# Patient Record
Sex: MALE | Race: BLACK OR AFRICAN AMERICAN | NOT HISPANIC OR LATINO | Employment: FULL TIME | URBAN - METROPOLITAN AREA
[De-identification: names, ages, dates, MRNs, and addresses within clinical notes are randomized per-mention and may not be internally consistent; named-entity substitution may affect disease eponyms.]

---
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
## 2022-01-24 ENCOUNTER — OFFICE VISIT (OUTPATIENT)
Dept: FAMILY MEDICINE | Facility: CLINIC | Age: 22
End: 2022-01-24
Payer: COMMERCIAL

## 2022-01-24 VITALS
BODY MASS INDEX: 18.22 KG/M2 | WEIGHT: 127.3 LBS | HEIGHT: 70 IN | DIASTOLIC BLOOD PRESSURE: 81 MMHG | TEMPERATURE: 98.9 F | HEART RATE: 63 BPM | SYSTOLIC BLOOD PRESSURE: 119 MMHG | OXYGEN SATURATION: 100 %

## 2022-01-24 DIAGNOSIS — Z00.00 HEALTHCARE MAINTENANCE: ICD-10-CM

## 2022-01-24 DIAGNOSIS — F19.10 SUBSTANCE ABUSE (H): Primary | ICD-10-CM

## 2022-01-24 DIAGNOSIS — G47.00 INSOMNIA, UNSPECIFIED TYPE: ICD-10-CM

## 2022-01-24 DIAGNOSIS — F11.93 OPIOID WITHDRAWAL (H): ICD-10-CM

## 2022-01-24 LAB
ALBUMIN SERPL-MCNC: 4.2 G/DL (ref 3.4–5)
ALP SERPL-CCNC: 93 U/L (ref 40–150)
ALT SERPL W P-5'-P-CCNC: 98 U/L (ref 0–70)
ANION GAP SERPL CALCULATED.3IONS-SCNC: 7 MMOL/L (ref 3–14)
AST SERPL W P-5'-P-CCNC: 45 U/L (ref 0–45)
BASOPHILS # BLD AUTO: 0 10E3/UL (ref 0–0.2)
BASOPHILS NFR BLD AUTO: 1 %
BILIRUB SERPL-MCNC: 0.5 MG/DL (ref 0.2–1.3)
BUN SERPL-MCNC: 14 MG/DL (ref 7–30)
CALCIUM SERPL-MCNC: 9.4 MG/DL (ref 8.5–10.1)
CHLORIDE BLD-SCNC: 104 MMOL/L (ref 94–109)
CO2 SERPL-SCNC: 28 MMOL/L (ref 20–32)
CREAT SERPL-MCNC: 0.76 MG/DL (ref 0.66–1.25)
EOSINOPHIL # BLD AUTO: 0.1 10E3/UL (ref 0–0.7)
EOSINOPHIL NFR BLD AUTO: 2 %
ERYTHROCYTE [DISTWIDTH] IN BLOOD BY AUTOMATED COUNT: 12.3 % (ref 10–15)
GFR SERPL CREATININE-BSD FRML MDRD: >90 ML/MIN/1.73M2
GLUCOSE BLD-MCNC: 88 MG/DL (ref 70–99)
HCT VFR BLD AUTO: 38.5 % (ref 40–53)
HGB BLD-MCNC: 13.1 G/DL (ref 13.3–17.7)
IMM GRANULOCYTES # BLD: 0 10E3/UL
IMM GRANULOCYTES NFR BLD: 0 %
LYMPHOCYTES # BLD AUTO: 2.6 10E3/UL (ref 0.8–5.3)
LYMPHOCYTES NFR BLD AUTO: 44 %
MCH RBC QN AUTO: 29.2 PG (ref 26.5–33)
MCHC RBC AUTO-ENTMCNC: 34 G/DL (ref 31.5–36.5)
MCV RBC AUTO: 86 FL (ref 78–100)
MONOCYTES # BLD AUTO: 0.5 10E3/UL (ref 0–1.3)
MONOCYTES NFR BLD AUTO: 9 %
NEUTROPHILS # BLD AUTO: 2.6 10E3/UL (ref 1.6–8.3)
NEUTROPHILS NFR BLD AUTO: 44 %
NRBC # BLD AUTO: 0 10E3/UL
NRBC BLD AUTO-RTO: 0 /100
PLATELET # BLD AUTO: 235 10E3/UL (ref 150–450)
POTASSIUM BLD-SCNC: 4.6 MMOL/L (ref 3.4–5.3)
PROT SERPL-MCNC: 8 G/DL (ref 6.8–8.8)
RBC # BLD AUTO: 4.48 10E6/UL (ref 4.4–5.9)
SODIUM SERPL-SCNC: 139 MMOL/L (ref 133–144)
WBC # BLD AUTO: 5.9 10E3/UL (ref 4–11)

## 2022-01-24 PROCEDURE — 86706 HEP B SURFACE ANTIBODY: CPT | Performed by: NURSE PRACTITIONER

## 2022-01-24 PROCEDURE — 86704 HEP B CORE ANTIBODY TOTAL: CPT | Performed by: NURSE PRACTITIONER

## 2022-01-24 PROCEDURE — 80053 COMPREHEN METABOLIC PANEL: CPT | Performed by: NURSE PRACTITIONER

## 2022-01-24 PROCEDURE — 86803 HEPATITIS C AB TEST: CPT | Performed by: NURSE PRACTITIONER

## 2022-01-24 PROCEDURE — 87340 HEPATITIS B SURFACE AG IA: CPT | Performed by: NURSE PRACTITIONER

## 2022-01-24 PROCEDURE — 86481 TB AG RESPONSE T-CELL SUSP: CPT | Performed by: NURSE PRACTITIONER

## 2022-01-24 PROCEDURE — 85025 COMPLETE CBC W/AUTO DIFF WBC: CPT | Performed by: NURSE PRACTITIONER

## 2022-01-24 RX ORDER — NICOTINE 21 MG/24HR
1 PATCH, TRANSDERMAL 24 HOURS TRANSDERMAL EVERY 24 HOURS
COMMUNITY

## 2022-01-24 RX ORDER — TRAZODONE HYDROCHLORIDE 100 MG/1
100 TABLET ORAL
COMMUNITY
End: 2022-01-24

## 2022-01-24 RX ORDER — ACETAMINOPHEN 500 MG
500-1000 TABLET ORAL EVERY 4 HOURS PRN
COMMUNITY

## 2022-01-24 RX ORDER — AMOXICILLIN 250 MG
1 CAPSULE ORAL 3 TIMES DAILY PRN
COMMUNITY

## 2022-01-24 RX ORDER — IBUPROFEN 200 MG
200-600 TABLET ORAL EVERY 4 HOURS PRN
COMMUNITY

## 2022-01-24 RX ORDER — DOCUSATE SODIUM 100 MG/1
100 CAPSULE, LIQUID FILLED ORAL 2 TIMES DAILY PRN
COMMUNITY
End: 2022-01-24

## 2022-01-24 RX ORDER — HYDROXYZINE HYDROCHLORIDE 25 MG/1
25-50 TABLET, FILM COATED ORAL EVERY 6 HOURS PRN
Qty: 60 TABLET | Refills: 1 | Status: SHIPPED | OUTPATIENT
Start: 2022-01-24 | End: 2022-03-14

## 2022-01-24 RX ORDER — TRAZODONE HYDROCHLORIDE 50 MG/1
75 TABLET, FILM COATED ORAL
Qty: 30 TABLET | Refills: 1 | Status: SHIPPED | OUTPATIENT
Start: 2022-01-24

## 2022-01-24 RX ORDER — BUPRENORPHINE HYDROCHLORIDE AND NALOXONE HYDROCHLORIDE DIHYDRATE 8; 2 MG/1; MG/1
1 TABLET SUBLINGUAL 2 TIMES DAILY
COMMUNITY
End: 2022-01-24

## 2022-01-24 ASSESSMENT — ENCOUNTER SYMPTOMS
NAUSEA: 0
BRUISES/BLEEDS EASILY: 0
EYES NEGATIVE: 1
TREMORS: 0
DIARRHEA: 0
FEVER: 0
FATIGUE: 0
COUGH: 0
JOINT SWELLING: 0
SORE THROAT: 0
SHORTNESS OF BREATH: 0
AGITATION: 0
WHEEZING: 0
ARTHRALGIAS: 0
ABDOMINAL PAIN: 0
CONSTIPATION: 1
ALLERGIC/IMMUNOLOGIC NEGATIVE: 1
ADENOPATHY: 0
VOMITING: 0
DIZZINESS: 0
UNEXPECTED WEIGHT CHANGE: 0
WEAKNESS: 0
PALPITATIONS: 0
NERVOUS/ANXIOUS: 0

## 2022-01-24 ASSESSMENT — PATIENT HEALTH QUESTIONNAIRE - PHQ9
5. POOR APPETITE OR OVEREATING: NOT AT ALL
SUM OF ALL RESPONSES TO PHQ QUESTIONS 1-9: 1

## 2022-01-24 ASSESSMENT — ANXIETY QUESTIONNAIRES
1. FEELING NERVOUS, ANXIOUS, OR ON EDGE: NOT AT ALL
2. NOT BEING ABLE TO STOP OR CONTROL WORRYING: NOT AT ALL
IF YOU CHECKED OFF ANY PROBLEMS ON THIS QUESTIONNAIRE, HOW DIFFICULT HAVE THESE PROBLEMS MADE IT FOR YOU TO DO YOUR WORK, TAKE CARE OF THINGS AT HOME, OR GET ALONG WITH OTHER PEOPLE: NOT DIFFICULT AT ALL
7. FEELING AFRAID AS IF SOMETHING AWFUL MIGHT HAPPEN: NOT AT ALL
3. WORRYING TOO MUCH ABOUT DIFFERENT THINGS: NOT AT ALL
GAD7 TOTAL SCORE: 0
6. BECOMING EASILY ANNOYED OR IRRITABLE: NOT AT ALL
5. BEING SO RESTLESS THAT IT IS HARD TO SIT STILL: NOT AT ALL

## 2022-01-24 ASSESSMENT — MIFFLIN-ST. JEOR: SCORE: 1596.86

## 2022-01-24 NOTE — PATIENT INSTRUCTIONS
-Use hydroxyzine for withdrawal symptoms.    -Start new dose of trazodone tonight     Patient Education     Opioid Withdrawal  Opioid withdrawal occurs if you've used opioids daily for at least 3 weeks. Symptoms often start about 12 hours after the last dose of the opioid. But this varies greatly. It depends on which opioid you were using and how you were taking it (injecting, snorting, or pills). Withdrawal symptoms last from 3 to 5 days. They may include yawning, sweating, runny nose, restlessness, stomach cramping, diarrhea, nausea, vomiting, hot and cold flashes, and trouble sleeping.   Home care  Follow these guidelines when caring for yourself at home:     Stay with someone who can help you and give you emotional support during this time. Don't take more of the addicting drug to stop your symptoms.    If you have stomach cramps, nausea, or vomiting, take only clear liquids until the symptoms get better. Adults should drink a total of 2 to 3 quarts of liquid daily. It's best to take small frequent drinks rather than a few large ones. You may have liquids in any of these forms: mineral water, apple juice, sports drinks, soft drinks without caffeine, clear broth soups, plain gelatin, and ice pops.    If you've been prescribed medicines to help manage your withdrawal symptoms, take them exactly as prescribed. Don't change or stop your medicine without calling your provider.    Don't use alcohol, caffeine, or tobacco during this time.    If you were given a clonidine patch, leave this on for 7 days. Call your provider if you have excess dizziness or drowsiness.  Follow-up care  Follow up with your healthcare provider as advised. After you've gone through withdrawal, enroll in an outpatient treatment program. Getting through withdrawal is the first step in a long journey to living a drug-free life. Having trained professionals support you will make it more likely that you'll succeed.   When to get medical  advice  Call your healthcare provider right away if you have any of these:     Fever of 100.4 F (38 C) or higher, or as directed by your provider    Shaking chills    Inability to keep down liquids for 8 hours    Frequent diarrhea    Signs of infection at the site of IV needle use (redness, warmth, pain, or swelling)  Call 911  Call 911 if any of these occur:     Trouble breathing, swallowing, or wheezing    Severe confusion    Extreme drowsiness or trouble awakening    Fainting (loss of consciousness)    Rapid heart rate or very slow heart rate    Very low or very high blood pressure    Vomiting blood, or large amounts of blood in stool    Seizure  [employer] last reviewed this educational content on 2/1/2020 2000-2021 [employer]. All rights reserved. This information is not intended as a substitute for professional medical care. Always follow your healthcare professional's instructions.

## 2022-01-24 NOTE — NURSING NOTE
"ROOM:2    Preferred Name: Peter     22 year old  Chief Complaint   Patient presents with     Physical       Blood pressure 119/81, pulse 63, temperature 98.9  F (37.2  C), temperature source Oral, height 1.783 m (5' 10.2\"), weight 57.7 kg (127 lb 4.8 oz), SpO2 100 %. Body mass index is 18.16 kg/m .  BP completed using cuff size:    There is no problem list on file for this patient.      Wt Readings from Last 2 Encounters:   01/24/22 57.7 kg (127 lb 4.8 oz)   11/06/21 54.4 kg (120 lb) (4 %, Z= -1.81)*     * Growth percentiles are based on CDC (Boys, 2-20 Years) data.     BP Readings from Last 3 Encounters:   01/24/22 119/81   11/06/21 (!) 142/97       No Known Allergies    Current Outpatient Medications   Medication     acetaminophen (TYLENOL) 500 MG tablet     aspirin effervescent (BHAKTI SELZER ORIGINAL) 325 MG TBEF tablet     buprenorphine-naloxone (SUBOXONE) 8-2 MG SUBL sublingual tablet     docusate sodium (COLACE) 100 MG capsule     ibuprofen (ADVIL/MOTRIN) 200 MG tablet     naloxone (NARCAN) 4 MG/0.1ML nasal spray     nicotine (NICODERM CQ) 14 MG/24HR 24 hr patch     nicotine (NICORETTE) 2 MG gum     senna-docusate (SENOKOT-S/PERICOLACE) 8.6-50 MG tablet     traZODone (DESYREL) 100 MG tablet     No current facility-administered medications for this visit.       Social History     Tobacco Use     Smoking status: Current Some Day Smoker     Smokeless tobacco: Never Used   Vaping Use     Vaping Use: Every day   Substance Use Topics     Alcohol use: Not Currently     Drug use: Not Currently       Honoring Choices - Health Care Directive Guide offered to patient at time of visit.    Health Maintenance Due   Topic Date Due     PREVENTIVE CARE VISIT  Never done     ADVANCE CARE PLANNING  Never done     COVID-19 Vaccine (1) Never done     HIV SCREENING  Never done     HEPATITIS C SCREENING  Never done     INFLUENZA VACCINE (1) 09/01/2021       Immunization History   Administered Date(s) Administered     HPV " 06/16/2016, 10/07/2016, 02/27/2017     Hep B, Peds or Adolescent 12/10/2015, 06/16/2016, 09/26/2017     HepA-ped 2 Dose 12/10/2015, 06/16/2016     Influenza Intranasal Vaccine 4 valent (FluMist) 12/10/2015     Influenza Vaccine IM > 6 months Valent IIV4 (Alfuria,Fluzone) 09/26/2017     Influenza,INJ,MDCK,PF,Quad >4yrs 10/22/2016     MMR 10/13/2014, 12/10/2015     Meningococcal (Menveo ) 10/13/2014, 09/03/2019     Poliovirus, inactivated (IPV) 12/10/2015, 06/16/2016     TD (ADULT, 7+) 12/10/2015, 06/16/2016     Tdap (Adacel,Boostrix) 10/13/2014, 03/03/2020     Varicella 10/13/2014, 12/10/2015       No results found for: PAP    No lab results found.    PHQ-2 ( 1999 Pfizer) 1/24/2022   Q1: Little interest or pleasure in doing things 0   Q2: Feeling down, depressed or hopeless 0   PHQ-2 Score 0       PHQ-9 SCORE 1/24/2022   PHQ-9 Total Score 1       OMERO-7 SCORE 1/24/2022   Total Score 0       No flowsheet data found.    Jessica Brunson    January 24, 2022 12:15 PM

## 2022-01-24 NOTE — PROGRESS NOTES
Patient: Peter Jack YOB: 2000  Date of Exam: January/24/2022   Arrival Time: 12 03 PM  Departure Time: 01 10 PM    Allergies: No Known Allergies    Patient Concerns: No chief complaint on file.      Immunizations:   Most Recent Immunizations   Administered Date(s) Administered     HPV 02/27/2017     Hep B, Peds or Adolescent 09/26/2017     HepA-ped 2 Dose 06/16/2016     Influenza Intranasal Vaccine 4 valent (FluMist) 12/10/2015     Influenza Vaccine IM > 6 months Valent IIV4 (Alfuria,Fluzone) 09/26/2017     Influenza,INJ,MDCK,PF,Quad >4yrs 10/22/2016     MMR 12/10/2015     Meningococcal (Menveo ) 09/03/2019     Poliovirus, inactivated (IPV) 06/16/2016     TD (ADULT, 7+) 06/16/2016     Tdap (Adacel,Boostrix) 03/03/2020     Varicella 12/10/2015       Do you need any refills on your Medications today? No    Free of communicable diseases? Yes    Health Maintenance:   NO HEALTH MAINTENANCE DUE AT THIS TIME   Overdue for flu and covid vaccine- declines both.    HPI: 22 year old male presents for [facility] physical. Patient's drug of choice was oral fentanyl. Has been sober for 29 days since admittance for [facility]. Patient was on suboxone, however, he reports that he stopped taking suboxone since last night as he doesn't like the constipation that comes along with it. Patient denies previous IV drug use. Denies hx of sexual activity and unprotected sexual intercourse. Reports recent negative HIV test. Has used trazodone for insomnia however, he stopped the 100mg dose because he felt it was too strong while the 50mg dose wasn't effective for him.   Patient denies other acute complaints/concerns/symptoms.    Review of Systems   Constitutional: Negative for fatigue, fever and unexpected weight change.   HENT: Negative for congestion, sneezing and sore throat.    Eyes: Negative.    Respiratory: Negative for cough, shortness of breath and wheezing.    Cardiovascular: Negative for chest pain and palpitations.    Gastrointestinal: Positive for constipation. Negative for abdominal pain, diarrhea, nausea and vomiting.   Endocrine: Negative for cold intolerance and heat intolerance.   Genitourinary: Negative.    Musculoskeletal: Negative for arthralgias and joint swelling.   Skin: Negative.    Allergic/Immunologic: Negative.    Neurological: Negative for dizziness, tremors and weakness.   Hematological: Negative for adenopathy. Does not bruise/bleed easily.   Psychiatric/Behavioral: Negative for agitation and behavioral problems. The patient is not nervous/anxious.          History reviewed. No pertinent past medical history.    Past Surgical History:   Procedure Laterality Date     FINGER SURGERY  03/2020       Family History   Problem Relation Age of Onset     Diabetes Mother        Social History     Tobacco Use     Smoking status: Current Some Day Smoker     Smokeless tobacco: Never Used   Substance Use Topics     Alcohol use: Not Currently         General Physical Exam:  Vitals: Stable.   Past Medical History Reviewed? Yes.  Constitutional:   awake, alert, cooperative, no apparent distress, and appears stated age   Eyes:   Lids and lashes normal, pupils equal, round and reactive to light, extra ocular muscles intact, sclera clear, conjunctiva normal   ENT:   Normocephalic, without obvious abnormality, atraumatic, sinuses nontender on palpation, external ears without lesions, oral pharynx with moist mucous membranes, tonsils without erythema or exudates, gums normal and good dentition.   Neck:   Supple, symmetrical, trachea midline, no adenopathy, thyroid symmetric, not enlarged and no tenderness, skin normal   Hematologic / Lymphatic:   no cervical lymphadenopathy   Back:   Symmetric.   Lungs:   No increased work of breathing, good air exchange, clear to auscultation bilaterally, no crackles or wheezing   Cardiovascular:   Normal apical impulse, regular rate and rhythm, normal S1 and S2, no S3 or S4, and no murmur noted    Abdomen:   No scars, normal bowel sounds, soft, non-distended, non-tender, no masses palpated, no hepatosplenomegally     Genitounirinary:   Deferred by patient   Musculoskeletal:   There is no redness, warmth, or swelling of the joints.  Full range of motion noted.  Motor strength is 5 out of 5 all extremities bilaterally.  Tone is normal.   Neurologic:   Awake, alert, oriented to name, place and time.  Cranial nerves II-XII are grossly intact.  Motor is 5 out of 5 bilaterally.  Patellar Reflexes +2, gait is normal.   Neuropsychiatric:   General: normal, calm and normal eye contact   Skin:   no bruising or bleeding and normal skin color, texture, turgor       Recommended Diet and Special Instructions: No  Limitations or restrictions for activities: No  Information Pertinent to treatment in case of emergency: Recent discontinuation of suboxone. Advised hydroxyzine PRN for withdrawal symptoms. Trazodone at bedtime. Ibuprofen or tylenol for pain.    Assessment/Plan   1. Substance abuse (H)  - Pt reports recent HIV negative and denies IVDA/tattoos/sexual encounters.    - Hepatitis C Screen Reflex to HCV RNA Quant and Genotype  - Hepatitis B Surface Antibody  - Hepatitis B surface antigen  - Hepatitis B core antibody  - CBC with platelets differential  - Comprehensive metabolic panel  - Quantiferon TB Gold Plus    2. Opioid withdrawal (H)  - Concern for acute withdrawal symptoms given recent cessation of suboxone last night (suboxone half life 36 hours.) Spoke with NIRALI Ricks at [facility] who informed me that [facility]. Advised to start hydroxyzine as soon as withdrawal symptoms start in addition to restarting trazodone and iburprofen or tylenol prn. Keep clinic abreast on symptoms.  - hydrOXYzine (ATARAX) 25 MG tablet; Take 1-2 tablets (25-50 mg) by mouth every 6 hours as needed for itching  Dispense: 60 tablet; Refill: 1    3. Insomnia, unspecified  type  -Try trazodone 75 as 100mg was too strong but 50mg ineffective.  - traZODone (DESYREL) 50 MG tablet; Take 1.5 tablets (75 mg) by mouth nightly as needed for sleep  Dispense: 30 tablet; Refill: 1    4. Healthcare maintenance  Last dental appt 2 years ago. Pt declined COVID/flu vax.  - Dental Referral      Medication Changes:   MEDICATIONS:   Orders Placed This Encounter   Medications     DISCONTD: buprenorphine-naloxone (SUBOXONE) 8-2 MG SUBL sublingual tablet     Sig: Place 1 tablet under the tongue 2 times daily     nicotine (NICODERM CQ) 14 MG/24HR 24 hr patch     Sig: Place 1 patch onto the skin every 24 hours For 6 weeks     acetaminophen (TYLENOL) 500 MG tablet     Sig: Take 500-1,000 mg by mouth every 4 hours as needed     aspirin effervescent (BHAKTI SELZER ORIGINAL) 325 MG TBEF tablet     Sig: Take 2 tablets by mouth every 4 hours Dissolve 2 tabs in 4 oz of water before taking every 4 hours for heatburn, upset stomach     DISCONTD: docusate sodium (COLACE) 100 MG capsule     Sig: Take 100 mg by mouth 2 times daily as needed     ibuprofen (ADVIL/MOTRIN) 200 MG tablet     Sig: Take 200-600 mg by mouth every 4 hours as needed     naloxone (NARCAN) 4 MG/0.1ML nasal spray     Sig: Spray 4 mg into one nostril alternating nostrils once as needed every 2-3 minutes until assistance arrives     nicotine (NICORETTE) 2 MG gum     Sig: Place 2 mg inside cheek every 2 hours as needed     senna-docusate (SENOKOT-S/PERICOLACE) 8.6-50 MG tablet     Sig: Take 1 tablet by mouth 3 times daily as needed     DISCONTD: traZODone (DESYREL) 100 MG tablet     Sig: Take 100 mg by mouth nightly as needed     hydrOXYzine (ATARAX) 25 MG tablet     Sig: Take 1-2 tablets (25-50 mg) by mouth every 6 hours as needed for itching     Dispense:  60 tablet     Refill:  1     traZODone (DESYREL) 50 MG tablet     Sig: Take 1.5 tablets (75 mg) by mouth nightly as needed for sleep     Dispense:  30 tablet     Refill:  1          - Continue other  medications without change    Referrals   Referral to Dental - Please call [phone] to schedule your appointment    Follow up plan   Follow up if you are not improving in the next 4 weeks. or as needed.    Physical exam by Xochitl Dixon CNP, agree with above.    All questions/concerns addressed. Patient states understanding/agreement to POC.    BAILEY Lynn, CNP  [facility] [employer]

## 2022-01-24 NOTE — LETTER
January 26, 2022      Peter Jack  [address] [address]        Dear ,    We are writing to inform you of your test results.    You are immune to hepatitis B likely from previous immunization.    One of your liver enzymes (ALT) is slightly elevated. This may be from your suboxone or tylenol. Please refrain from taking tylenol or any alcohol until we repeat your labs.     Your complete blood count showed mild anemia. This may be normal for you. As long as you are not having symptoms including fatigue, pallor, shortness of breath, chest pain, nausea, vomiting or diarrhea,  then we will recheck your labs in in 4-6 weeks.    Resulted Orders   Hepatitis C Screen Reflex to HCV RNA Quant and Genotype   Result Value Ref Range    Hepatitis C Antibody Nonreactive Nonreactive    Narrative    Assay performance characteristics have not been established for newborns, infants, and children.   Hepatitis B Surface Antibody   Result Value Ref Range    Hepatitis B Surface Antibody 162.76 (H) <8.00 m[IU]/mL      Comment:      Reactive, Patient is considered to be immune to infection with hepatitis B when the value is greater than or equal to 12.0 mIU/mL.   Hepatitis B surface antigen   Result Value Ref Range    Hepatitis B Surface Antigen Nonreactive Nonreactive   Hepatitis B core antibody   Result Value Ref Range    Hepatitis B Core Antibody Total Nonreactive Nonreactive   Comprehensive metabolic panel   Result Value Ref Range    Sodium 139 133 - 144 mmol/L    Potassium 4.6 3.4 - 5.3 mmol/L    Chloride 104 94 - 109 mmol/L    Carbon Dioxide (CO2) 28 20 - 32 mmol/L    Anion Gap 7 3 - 14 mmol/L    Urea Nitrogen 14 7 - 30 mg/dL    Creatinine 0.76 0.66 - 1.25 mg/dL    Calcium 9.4 8.5 - 10.1 mg/dL    Glucose 88 70 - 99 mg/dL    Alkaline Phosphatase 93 40 - 150 U/L    AST 45 0 - 45 U/L    ALT 98 (H) 0 - 70 U/L    Protein Total 8.0 6.8 - 8.8 g/dL    Albumin 4.2 3.4 - 5.0 g/dL    Bilirubin Total 0.5 0.2 - 1.3  mg/dL    GFR Estimate >90 >60 mL/min/1.73m2      Comment:      Effective December 21, 2021 eGFRcr in adults is calculated using the 2021 CKD-EPI creatinine equation which includes age and gender (Tim et al., NE, DOI: 10.1056/HLHIvj0513887)   CBC with platelets and differential   Result Value Ref Range    WBC Count 5.9 4.0 - 11.0 10e3/uL    RBC Count 4.48 4.40 - 5.90 10e6/uL    Hemoglobin 13.1 (L) 13.3 - 17.7 g/dL    Hematocrit 38.5 (L) 40.0 - 53.0 %    MCV 86 78 - 100 fL    MCH 29.2 26.5 - 33.0 pg    MCHC 34.0 31.5 - 36.5 g/dL    RDW 12.3 10.0 - 15.0 %    Platelet Count 235 150 - 450 10e3/uL    % Neutrophils 44 %    % Lymphocytes 44 %    % Monocytes 9 %    % Eosinophils 2 %    % Basophils 1 %    % Immature Granulocytes 0 %    NRBCs per 100 WBC 0 <1 /100    Absolute Neutrophils 2.6 1.6 - 8.3 10e3/uL    Absolute Lymphocytes 2.6 0.8 - 5.3 10e3/uL    Absolute Monocytes 0.5 0.0 - 1.3 10e3/uL    Absolute Eosinophils 0.1 0.0 - 0.7 10e3/uL    Absolute Basophils 0.0 0.0 - 0.2 10e3/uL    Absolute Immature Granulocytes 0.0 <=0.4 10e3/uL    Absolute NRBCs 0.0 10e3/uL   Quantiferon TB Gold Plus Grey Tube   Result Value Ref Range    Quantiferon Nil Tube 0.10 IU/mL   Quantiferon TB Gold Plus Green Tube   Result Value Ref Range    Quantiferon TB1 Tube 0.07 IU/mL   Quantiferon TB Gold Plus Yellow Tube   Result Value Ref Range    Quantiferon TB2 Tube 0.09    Quantiferon TB Gold Plus Purple Tube   Result Value Ref Range    Quantiferon Mitogen 10.00 IU/mL   Quantiferon TB Gold Plus   Result Value Ref Range    Quantiferon-TB Gold Plus Negative Negative      Comment:      No interferon gamma response to M.tuberculosis antigens was detected. Infection with M.tuberculosis is unlikely, however a single negative result does not exclude infection. In patients at high risk for infection, a second test should be considered in accordance with the 2017 ATS/IDSA/CDC Clinical Pract  ice Guidelines for Diagnosis of Tuberculosis in Adults and  Children     TB1 Ag minus Nil Value -0.03 IU/mL    TB2 Ag minus Nil Value -0.01 IU/mL    Mitogen minus Nil Result 9.90 IU/mL    Nil Result 0.10 IU/mL       If you have any questions or concerns, please call the clinic at the number listed above.       Sincerely,      BAILEY Charles CNP

## 2022-01-25 LAB
HBV CORE AB SERPL QL IA: NONREACTIVE
HBV SURFACE AB SERPL IA-ACNC: 162.76 M[IU]/ML
HBV SURFACE AG SERPL QL IA: NONREACTIVE
HCV AB SERPL QL IA: NONREACTIVE

## 2022-01-25 ASSESSMENT — ANXIETY QUESTIONNAIRES: GAD7 TOTAL SCORE: 0

## 2022-01-26 LAB
GAMMA INTERFERON BACKGROUND BLD IA-ACNC: 0.1 IU/ML
M TB IFN-G BLD-IMP: NEGATIVE
M TB IFN-G CD4+ BCKGRND COR BLD-ACNC: 9.9 IU/ML
MITOGEN IGNF BCKGRD COR BLD-ACNC: -0.01 IU/ML
MITOGEN IGNF BCKGRD COR BLD-ACNC: -0.03 IU/ML
QUANTIFERON MITOGEN: 10 IU/ML
QUANTIFERON NIL TUBE: 0.1 IU/ML
QUANTIFERON TB1 TUBE: 0.07 IU/ML
QUANTIFERON TB2 TUBE: 0.09

## 2022-01-26 NOTE — RESULT ENCOUNTER NOTE
Reece Lopez and Viviane,    Can you please mail the letter that I have drafted to TREVIN REEVES for this patient?    Thanks!  Cheng cannot remember

## 2022-03-14 ENCOUNTER — OFFICE VISIT (OUTPATIENT)
Dept: FAMILY MEDICINE | Facility: CLINIC | Age: 22
End: 2022-03-14
Payer: COMMERCIAL

## 2022-03-14 VITALS
OXYGEN SATURATION: 95 % | SYSTOLIC BLOOD PRESSURE: 134 MMHG | HEIGHT: 71 IN | BODY MASS INDEX: 18.73 KG/M2 | DIASTOLIC BLOOD PRESSURE: 76 MMHG | WEIGHT: 133.8 LBS | HEART RATE: 92 BPM | TEMPERATURE: 98.9 F

## 2022-03-14 DIAGNOSIS — M54.6 RIGHT-SIDED THORACIC BACK PAIN, UNSPECIFIED CHRONICITY: ICD-10-CM

## 2022-03-14 DIAGNOSIS — M54.2 NECK PAIN: Primary | ICD-10-CM

## 2022-03-14 ASSESSMENT — ENCOUNTER SYMPTOMS
FATIGUE: 0
WEAKNESS: 0
NUMBNESS: 0
HEADACHES: 1
BACK PAIN: 1
NECK STIFFNESS: 1
NECK PAIN: 1
PARESTHESIAS: 0
CHILLS: 0
FEVER: 0

## 2022-03-14 NOTE — NURSING NOTE
"ROOM:2    Preferred Name: Peter     22 year old  Chief Complaint   Patient presents with     Referral       Blood pressure 134/76, pulse 92, temperature 98.9  F (37.2  C), temperature source Oral, height 1.793 m (5' 10.6\"), weight 60.7 kg (133 lb 12.8 oz), SpO2 95 %. Body mass index is 18.87 kg/m .      There is no problem list on file for this patient.      Wt Readings from Last 2 Encounters:   03/14/22 60.7 kg (133 lb 12.8 oz)   01/24/22 57.7 kg (127 lb 4.8 oz)     BP Readings from Last 3 Encounters:   03/14/22 134/76   01/24/22 119/81   11/06/21 (!) 142/97       No Known Allergies    Current Outpatient Medications   Medication     acetaminophen (TYLENOL) 500 MG tablet     ibuprofen (ADVIL/MOTRIN) 200 MG tablet     naloxone (NARCAN) 4 MG/0.1ML nasal spray     nicotine (NICODERM CQ) 14 MG/24HR 24 hr patch     nicotine (NICORETTE) 2 MG gum     senna-docusate (SENOKOT-S/PERICOLACE) 8.6-50 MG tablet     traZODone (DESYREL) 50 MG tablet     aspirin effervescent (BHAKTI SELZER ORIGINAL) 325 MG TBEF tablet     hydrOXYzine (ATARAX) 25 MG tablet     No current facility-administered medications for this visit.       Social History     Tobacco Use     Smoking status: Current Some Day Smoker     Smokeless tobacco: Never Used   Vaping Use     Vaping Use: Some days   Substance Use Topics     Alcohol use: Not Currently     Drug use: Not Currently     Types: Fentanyl       Honoring Choices - Health Care Directive Guide offered to patient at time of visit.    Health Maintenance Due   Topic Date Due     PREVENTIVE CARE VISIT  Never done     ADVANCE CARE PLANNING  Never done     COVID-19 Vaccine (1) Never done     Pneumococcal Vaccine: Pediatrics (0 to 5 Years) and At-Risk Patients (6 to 64 Years) (1 of 2 - PPSV23) Never done     HIV SCREENING  Never done     INFLUENZA VACCINE (1) 09/01/2021       Immunization History   Administered Date(s) Administered     HPV 06/16/2016, 10/07/2016, 02/27/2017     Hep B, Peds or Adolescent " 12/10/2015, 06/16/2016, 09/26/2017     HepA-ped 2 Dose 12/10/2015, 06/16/2016     Influenza Intranasal Vaccine 4 valent (FluMist) 12/10/2015     Influenza Vaccine IM > 6 months Valent IIV4 (Alfuria,Fluzone) 09/26/2017     Influenza,INJ,MDCK,PF,Quad >4yrs 10/22/2016     MMR 10/13/2014, 12/10/2015     Meningococcal (Menveo ) 10/13/2014, 09/03/2019     Poliovirus, inactivated (IPV) 12/10/2015, 06/16/2016     TD (ADULT, 7+) 12/10/2015, 06/16/2016     Tdap (Adacel,Boostrix) 10/13/2014, 03/03/2020     Varicella 10/13/2014, 12/10/2015       No results found for: PAP      Recent Labs   Lab Test 01/24/22  1312   ALT 98*   CR 0.76   GFRESTIMATED >90   ALBUMIN 4.2   POTASSIUM 4.6       PHQ-2 ( 1999 Pfizer) 3/14/2022 1/24/2022   Q1: Little interest or pleasure in doing things 1 0   Q2: Feeling down, depressed or hopeless 0 0   PHQ-2 Score 1 0       PHQ-9 SCORE 1/24/2022   PHQ-9 Total Score 1       OMERO-7 SCORE 1/24/2022   Total Score 0       No flowsheet data found.      Viviane Bearden, [facility]  March 14, 2022 8:02 AM

## 2022-03-14 NOTE — PATIENT INSTRUCTIONS
Patient Education     Relieving Back Pain  Back pain is a common problem. You can strain back muscles by lifting too much weight or just by moving the wrong way. Back strain can be uncomfortable, even painful. And it can take weeks or months to improve. To help yourself feel better and prevent future back strains, try these tips.  Important: Don't give aspirin to children or teens without first discussing it with your child's healthcare provider.  Ice    Ice reduces muscle pain and swelling. It helps most during the first 24 to 48 hours after an injury.    Wrap an ice pack or a bag of frozen peas in a thin towel. Never put ice directly on your skin.    Place the ice where your back hurts the most.    Don t ice for more than 20 minutes at a time.    You can use ice several times a day.  Medicines  Over-the-counter pain relievers include acetaminophen and anti-inflammatory medicines, which includes aspirin, naproxen, or ibuprofen. They can help ease discomfort. Some also reduce swelling.    Tell your healthcare provider about any medicines you are already taking.    Take medicines only as directed.  Manipulation and massage  Having manipulation by an osteopathic doctor or chiropractor may be helpful. Getting a massage also may help.   Heat  After the first 48 hours, heat can relax sore muscles and improve blood flow.    Try a warm bath or shower. Or use a heating pad set on low. To prevent a burn, keep a cloth between you and the heating pad.    Don t use a heating pad for more than 15 minutes at a time. Never sleep on a heating pad.  [employer] last reviewed this educational content on 6/1/2018 2000-2021 [employer]. All rights reserved. This information is not intended as a substitute for professional medical care. Always follow your healthcare professional's instructions.

## 2022-03-14 NOTE — PROGRESS NOTES
"Patient: Peter Jack YOB: 2000    Date of Exam: March/14/2022    Please be advised that this client resides in a facility in which narcotic medications are not permitted. If pain management is needed, please prescribe an alternative medication.     Peter Jack is a 22 year old who presents for the following    Patient presents with:  Referral      HPI: 22 year old male presents to discuss thoracic back and neck pain and to request a referral for a chiropractor. Onset for both was approximately 3 weeks ago after working out. Patient denies trauma to area. Admits to doing \"burpees and some squats\" which may have caused the symptoms. Pt does endorse chronic stress. Denies radiating symptoms including pain/numbness/tingling down the extremities.  Denies bowel/bladder incontinence. Denies saddle anesthesia. No other acute complaints/symptoms at time of exam.    Review of Systems   Constitutional: Negative for chills, fatigue and fever.   Musculoskeletal: Positive for back pain, neck pain and neck stiffness.   Neurological: Positive for headaches. Negative for weakness, numbness and paresthesias.   Constitutional, HEENT, cardiovascular, pulmonary, gi and gu systems are negative, except as otherwise noted.        Do you need any refills on your Medications today? No        General Physical Exam:  Vitals: /76   Pulse 92   Temp 98.9  F (37.2  C) (Oral)   Ht 1.793 m (5' 10.6\")   Wt 60.7 kg (133 lb 12.8 oz)   SpO2 95%   BMI 18.87 kg/m      Physical Exam  Constitutional:       General: He is not in acute distress.     Appearance: Normal appearance. He is not ill-appearing or toxic-appearing.   Neck:      Comments: Bilateral trapezius muscle tension noted on palpation. Cervical flexion/extension, rotation, and lateral flexion ROM WNL. R Cervical lateral flexion elicits pain to neck.   Musculoskeletal:      Cervical back: Normal range of motion and neck supple. No edema, erythema, rigidity " or crepitus. Muscular tenderness present. No spinous process tenderness.      Thoracic back: Tenderness present. No swelling, edema or bony tenderness. Normal range of motion.      Lumbar back: Normal range of motion. Negative right straight leg raise test and negative left straight leg raise test.        Back:       Comments: Muscle tenderness to R mid-thoracic back.  SLR negative for radiating symptoms, patient reports R big toe tingling with SLR.  Lumbar/thoracic flexion/extension, rotation, lateral flexion ROM WNL. Pain elicited with R rotation.  Heel walk, toe walk, squat and rise negative bilaterally.    Neurological:      Mental Status: He is alert.           If prescribed a controlled substance, may client take medication home when discharged from [facility]? N/A    Additional Comments:N/A    Assessment / Plan:  1. Neck pain  Likely secondary to stress/tension given concurrent headaches. Refer to PT for stretching exercises.  - Physical Therapy Referral; Future    2. Right-sided thoracic back pain, unspecified chronicity  Could be strain from weight-lifting given focal muscle tenderness to r mid-thoracic back. Unsure if R big toe tingling related to back pain given absence of lumbar pain.  Pt denies wearing tight fitting shoes. Refer to PT for further eval. Avoid weight lifting until seen by PT and/or pain subsides. Go to ED for any saddle anesthesia and/or bowel/bladder incontinence.   - Physical Therapy Referral; Future        Referrals Made:   Referral to Physical Therapy - If you have not heard from the scheduling office within 2 business days, please call [phone]  If a referral was made to a [facility] and you don't get a call from central scheduling please call [phone].  If a Mammogram was ordered for you at [facility] call [phone] to schedule or change your appointment.  If you had an XRay/CT/Ultrasound/MRI ordered the number is [phone] to schedule or  change your radiology appointment.     Follow up as needed.    Medication changes made at today's visit: MEDICATIONS:        - Continue other medications without change    BAILEY Lynn CNP March 14, 2022     Arrival Time: 07 44 AM  Departure Time: 08 28 AM